# Patient Record
Sex: FEMALE | Race: WHITE | NOT HISPANIC OR LATINO | URBAN - METROPOLITAN AREA
[De-identification: names, ages, dates, MRNs, and addresses within clinical notes are randomized per-mention and may not be internally consistent; named-entity substitution may affect disease eponyms.]

---

## 2021-01-11 ENCOUNTER — OFFICE VISIT (OUTPATIENT)
Dept: OBGYN CLINIC | Age: 31
End: 2021-01-11
Payer: COMMERCIAL

## 2021-01-11 VITALS — SYSTOLIC BLOOD PRESSURE: 112 MMHG | DIASTOLIC BLOOD PRESSURE: 80 MMHG | WEIGHT: 170.4 LBS

## 2021-01-11 DIAGNOSIS — Z20.828 EXPOSURE TO HERPES SIMPLEX VIRUS (HSV): ICD-10-CM

## 2021-01-11 DIAGNOSIS — Z01.419 ENCOUNTER FOR ANNUAL ROUTINE GYNECOLOGICAL EXAMINATION: Primary | ICD-10-CM

## 2021-01-11 PROCEDURE — 99385 PREV VISIT NEW AGE 18-39: CPT | Performed by: NURSE PRACTITIONER

## 2021-01-11 RX ORDER — SULFAMETHOXAZOLE AND TRIMETHOPRIM 800; 160 MG/1; MG/1
1 TABLET ORAL 2 TIMES DAILY
COMMUNITY
Start: 2020-12-08

## 2021-01-11 NOTE — PATIENT INSTRUCTIONS
Pap Smear   GENERAL INFORMATION:   What is a Pap smear? A Pap smear, or Pap test, is a procedure to check your cervix for abnormal cells  The cervix is the narrow opening at the bottom of your uterus  The cervix meets the top part of the vagina  How do I prepare for a Pap smear? The best time to schedule the test is right after your period stops  Do not have a Pap smear during your monthly period  Do not have intercourse or put anything in your vagina for 24 hours before your test    What will happen during a Pap smear? · You will lie on your back and place your feet on footrests called stirrups  Your caregiver will gently insert a device called a speculum into your vagina  The speculum is used to spread the walls of your vagina so he can see your cervix  He will use a thin brush or cotton swab to collect cells from the inside of your cervix  · Your caregiver will also collect cells from the surface of your cervix with a plastic or wooden tool called a spatula  He may also gently scrape the upper part of your vagina for a sample  The samples are placed in a container with liquid or on a glass slide  They are sent to a lab and examined for abnormal cells  How often do I need a Pap smear? Pap smears are usually done every 1 to 3 years  You may need a Pap smear more often if you have any of the following:  · Positive test result for the human papillomavirus (HPV)    · Cervical intraepithelial neoplasm or cervical cancer    · HIV    · A weak immune system    · Exposure to diethylstilbestrol (RODOLFO) medicine when your mother was pregnant with you  CARE AGREEMENT:   You have the right to help plan your care  Learn about your health condition and how it may be treated  Discuss treatment options with your caregivers to decide what care you want to receive  You always have the right to refuse treatment  The above information is an  only   It is not intended as medical advice for individual conditions or treatments  Talk to your doctor, nurse or pharmacist before following any medical regimen to see if it is safe and effective for you  © 2014 [address] is for End User's use only and may not be sold, redistributed or otherwise used for commercial purposes  All illustrations and images included in CareNotes® are the copyrighted property of [employer]  or Johnnie Ricardo

## 2021-01-11 NOTE — PROGRESS NOTES
Diagnoses and all orders for this visit:    1  Encounter for annual routine gynecological examination  -     Liquid-based pap, screening  Pap collected today, discussed new guidelines  Safe sex practices and condom use encouraged  Healthy eating and nutrition, daily exercise discussed and SBE reinforced  Call with any issues and all questions and concerns addressed  2  Exposure to herpes simplex virus (HSV)  Will call if ever experiences an oral or genital outbreak  If genital can do a swab to confirm  Other orders  -     sulfamethoxazole-trimethoprim (BACTRIM DS) 800-160 mg per tablet; Take 1 tablet by mouth 2 (two) times a day          All questions and concerns answered  Patient to call with any questions  Pleasant 31 y o  nulliparous, premenopausal female here for new patient annual exam  She denies any issues with bleeding or her menses  Reports regular cycles  She is currently being treated for UTI-Rt flank pain, on Cipro  Denies history of abnormal pap smears  Last Pap unknown, so pap today  Denies vaginal or breast issues, today  Denies pelvic pain  Sexually active without any concerns and pt declines STD testing  She is concerned that she was exposed to HSV-1 and this showed on previous blood work, but denies having and genital or oral outbreaks so will continue to monitor  Denies F/C/N/V      Past Medical History:   Diagnosis Date    Anxiety     Varicella      Past Surgical History:   Procedure Laterality Date    ANTERIOR CRUCIATE LIGAMENT REPAIR      KNEE CARTILAGE SURGERY       Family History   Problem Relation Age of Onset    Hypertension Mother     Hypertension Father     No Known Problems Sister     Breast cancer Maternal Grandmother     Cancer Maternal Grandfather     Dementia Maternal Grandfather     Alzheimer's disease Maternal Grandfather     Ovarian cancer Neg Hx     Cervical cancer Neg Hx     Uterine cancer Neg Hx      Social History     Tobacco Use  Smoking status: Never Smoker    Smokeless tobacco: Never Used   Substance Use Topics    Alcohol use: Yes     Comment: rarely     Drug use: Yes     Types: Marijuana       Current Outpatient Medications:     sulfamethoxazole-trimethoprim (BACTRIM DS) 800-160 mg per tablet, Take 1 tablet by mouth 2 (two) times a day, Disp: , Rfl:   Patient Active Problem List    Diagnosis Date Noted    Encounter for annual routine gynecological examination 2021    Exposure to herpes simplex virus (HSV) 2021       Allergies   Allergen Reactions    Amoxicillin Rash       OB History    Para Term  AB Living   0 0 0 0 0 0   SAB TAB Ectopic Multiple Live Births   0 0 0 0 0       Vitals:    21 1500   BP: 112/80   BP Location: Right arm   Patient Position: Sitting   Cuff Size: Standard   Weight: 77 3 kg (170 lb 6 4 oz)     There is no height or weight on file to calculate BMI  Review of Systems   Constitutional: Negative for chills, fatigue, fever and unexpected weight change  Respiratory: Negative for shortness of breath  Gastrointestinal: Negative for anal bleeding, blood in stool, constipation and diarrhea  Genitourinary: Negative for difficulty urinating, dysuria and hematuria  OBGyn Exam    Physical Exam   Constitutional: She appears well-developed and well-nourished  No distress  Head: Normocephalic  Neck: Normal range of motion  Neck supple  Pulmonary: Effort normal   Breasts: bilateral without masses, skin changes or nipple discharge  Bilaterally soft and warm to touch  No areas of erythema or pain  Abdominal: Soft  Non-tender  Pelvic exam was performed with patient supine  No labial fusion  There is no rash, tenderness, lesion or injury on the right labia  There is no rash, tenderness, lesion or injury on the left labia  Uterus is not deviated, not enlarged, not fixed and not tender  Cervix exhibits no motion tenderness, no discharge and no friability   Right adnexum displays no mass, no tenderness and no fullness  Left adnexum displays no mass, no tenderness and no fullness  No erythema or tenderness in the vagina  No foreign body in the vagina  No signs of injury around the vagina  No vaginal discharge found  PAP collected w/o difficulty  Lymphadenopathy:        Right: No inguinal adenopathy present          Left: No inguinal adenopathy present

## 2021-10-28 ENCOUNTER — TELEPHONE (OUTPATIENT)
Dept: OBGYN CLINIC | Facility: CLINIC | Age: 31
End: 2021-10-28